# Patient Record
Sex: MALE | Race: OTHER | HISPANIC OR LATINO
[De-identification: names, ages, dates, MRNs, and addresses within clinical notes are randomized per-mention and may not be internally consistent; named-entity substitution may affect disease eponyms.]

---

## 2017-03-28 ENCOUNTER — APPOINTMENT (OUTPATIENT)
Dept: SURGERY | Facility: CLINIC | Age: 52
End: 2017-03-28

## 2017-03-28 VITALS
OXYGEN SATURATION: 97 % | HEART RATE: 93 BPM | HEIGHT: 71 IN | BODY MASS INDEX: 44.1 KG/M2 | SYSTOLIC BLOOD PRESSURE: 100 MMHG | DIASTOLIC BLOOD PRESSURE: 72 MMHG | WEIGHT: 315 LBS | TEMPERATURE: 97.9 F

## 2017-03-28 DIAGNOSIS — F17.200 NICOTINE DEPENDENCE, UNSPECIFIED, UNCOMPLICATED: ICD-10-CM

## 2017-03-28 DIAGNOSIS — I10 ESSENTIAL (PRIMARY) HYPERTENSION: ICD-10-CM

## 2017-04-11 ENCOUNTER — APPOINTMENT (OUTPATIENT)
Dept: SURGERY | Facility: CLINIC | Age: 52
End: 2017-04-11

## 2017-05-31 ENCOUNTER — OUTPATIENT (OUTPATIENT)
Dept: OUTPATIENT SERVICES | Facility: HOSPITAL | Age: 52
LOS: 1 days | End: 2017-05-31
Payer: COMMERCIAL

## 2017-05-31 DIAGNOSIS — R06.09 OTHER FORMS OF DYSPNEA: ICD-10-CM

## 2017-05-31 PROCEDURE — 93017 CV STRESS TEST TRACING ONLY: CPT

## 2017-05-31 PROCEDURE — A9500: CPT

## 2017-05-31 PROCEDURE — 78452 HT MUSCLE IMAGE SPECT MULT: CPT

## 2017-05-31 PROCEDURE — A9505: CPT

## 2017-06-05 ENCOUNTER — APPOINTMENT (OUTPATIENT)
Dept: SURGERY | Facility: CLINIC | Age: 52
End: 2017-06-05

## 2017-06-05 VITALS — BODY MASS INDEX: 51.68 KG/M2 | WEIGHT: 315 LBS

## 2017-06-12 ENCOUNTER — APPOINTMENT (OUTPATIENT)
Dept: GASTROENTEROLOGY | Facility: CLINIC | Age: 52
End: 2017-06-12

## 2017-06-12 VITALS
RESPIRATION RATE: 14 BRPM | SYSTOLIC BLOOD PRESSURE: 136 MMHG | OXYGEN SATURATION: 98 % | TEMPERATURE: 98.1 F | BODY MASS INDEX: 44.1 KG/M2 | DIASTOLIC BLOOD PRESSURE: 80 MMHG | WEIGHT: 315 LBS | HEART RATE: 89 BPM | HEIGHT: 71 IN

## 2017-06-12 RX ORDER — AMLODIPINE BESYLATE 10 MG/1
10 TABLET ORAL
Refills: 0 | Status: ACTIVE | COMMUNITY

## 2017-06-12 RX ORDER — LISINOPRIL 10 MG/1
10 TABLET ORAL
Refills: 0 | Status: ACTIVE | COMMUNITY

## 2017-06-15 VITALS
HEART RATE: 90 BPM | OXYGEN SATURATION: 96 % | SYSTOLIC BLOOD PRESSURE: 153 MMHG | TEMPERATURE: 97 F | RESPIRATION RATE: 16 BRPM | DIASTOLIC BLOOD PRESSURE: 78 MMHG

## 2017-06-15 RX ORDER — CHLORHEXIDINE GLUCONATE 213 G/1000ML
1 SOLUTION TOPICAL ONCE
Qty: 0 | Refills: 0 | Status: DISCONTINUED | OUTPATIENT
Start: 2017-06-16 | End: 2017-06-16

## 2017-06-15 NOTE — H&P ADULT - FAMILY HISTORY
Mother  Still living? Unknown  Family history of diabetes mellitus (DM), Age at diagnosis: Age Unknown     Child  Still living? Unknown  Family history of diabetes mellitus (DM), Age at diagnosis: Age Unknown

## 2017-06-15 NOTE — H&P ADULT - ASSESSMENT
53 y/o obese M with PMHx HTN, HLD, DM who presents for cardiac clearance in preparation for upcoming bariatric surgery. The patient denies cp, palpitations, dizziness, n/v, diaphoresis, orthopnea, PND, or syncope. He reports COLVIN when climbing stairs. He reports his exercise tolerance is limited secondary to foot pain which he attributes to him being overweight. Patient had NST on 5/31/17 revealing small area of mild ischemia in the inferolateral wall, overall left ventricular systolic function is normal with inferolateral wall hypokinesis, EF 57%. Echocardiogram from 5/9/17 revealed normal valvular function, mild MS, normal LV systolic function with EF > 55%, dilated aortic root at 4.0 cm, technically difficult study. In light of patient’s risk factors and abnormal NST, patient is recommended for cardiac catheterization with possible intervention if clinically indicated in order to obtain cardiac clearance for upcoming bariatric surgery.     Patient took ASA 81 mg this AM. Patient not to be loaded prior to procedure as per Dr. Hackett. IVF @ 75cc/hr.     ASA III and Mallampati III  Risks & benefits of procedure and alternative therapy have been explained to the patient including but not limited to: allergic reaction, bleeding w/possible need for blood transfusion, infection, renal and vascular compromise, limb damage, arrhythmia, stroke, vessel dissection/perforation, Myocardial infarction, emergent CABG. Informed consent obtained and in chart.

## 2017-06-15 NOTE — H&P ADULT - HISTORY OF PRESENT ILLNESS
53 y/o obese M with PMHx HTN, HLD, DM who presents for cardiac clearance in preparation for upcoming bariatric surgery. The patient denies cp, palpitations, dizziness, n/v, diaphoresis, orthopnea, PND, or syncope. He reports COLVIN when climbing stairs. He reports his exercise tolerance is limited secondary to foot pain which he attributes to him being overweight. Patient had NST on 5/31/17 revealing small area of mild ischemia in the inferolateral wall, overall left ventricular systolic function is normal with inferolateral wall hypokinesis, EF 57%. Echocardiogram from 5/9/17 revealed normal valvular function, mild CT, normal LV systolic function with EF > 55%, dilated aortic root at 4.0 cm, technically difficult study. In light of patient’s risk factors and abnormal NST, patient is recommended for cardiac catheterization with possible intervention if clinically indicated in order to obtain cardiac clearance for upcoming bariatric surgery.

## 2017-06-16 ENCOUNTER — OUTPATIENT (OUTPATIENT)
Dept: OUTPATIENT SERVICES | Facility: HOSPITAL | Age: 52
LOS: 1 days | Discharge: NURSING, REHAB OR LONG-TERM CARE | End: 2017-06-16
Payer: COMMERCIAL

## 2017-06-16 DIAGNOSIS — Z01.810 ENCOUNTER FOR PREPROCEDURAL CARDIOVASCULAR EXAMINATION: ICD-10-CM

## 2017-06-16 LAB
ALBUMIN SERPL ELPH-MCNC: 4 G/DL — SIGNIFICANT CHANGE UP (ref 3.3–5)
ALP SERPL-CCNC: 80 U/L — SIGNIFICANT CHANGE UP (ref 40–120)
ALT FLD-CCNC: 55 U/L — HIGH (ref 10–45)
ANION GAP SERPL CALC-SCNC: 16 MMOL/L — SIGNIFICANT CHANGE UP (ref 5–17)
APTT BLD: 39.1 SEC — HIGH (ref 27.5–37.4)
AST SERPL-CCNC: 43 U/L — HIGH (ref 10–40)
BASOPHILS NFR BLD AUTO: 0.8 % — SIGNIFICANT CHANGE UP (ref 0–2)
BILIRUB SERPL-MCNC: 0.7 MG/DL — SIGNIFICANT CHANGE UP (ref 0.2–1.2)
BUN SERPL-MCNC: 12 MG/DL — SIGNIFICANT CHANGE UP (ref 7–23)
CALCIUM SERPL-MCNC: 8.7 MG/DL — SIGNIFICANT CHANGE UP (ref 8.4–10.5)
CHLORIDE SERPL-SCNC: 100 MMOL/L — SIGNIFICANT CHANGE UP (ref 96–108)
CHOLEST SERPL-MCNC: 173 MG/DL — SIGNIFICANT CHANGE UP (ref 10–199)
CO2 SERPL-SCNC: 22 MMOL/L — SIGNIFICANT CHANGE UP (ref 22–31)
CREAT SERPL-MCNC: 0.9 MG/DL — SIGNIFICANT CHANGE UP (ref 0.5–1.3)
CRP SERPL-MCNC: 0.4 MG/DL — SIGNIFICANT CHANGE UP (ref 0–0.4)
EOSINOPHIL NFR BLD AUTO: 3.2 % — SIGNIFICANT CHANGE UP (ref 0–6)
GLUCOSE SERPL-MCNC: 125 MG/DL — HIGH (ref 70–99)
HBA1C BLD-MCNC: 7 % — HIGH (ref 4–5.6)
HCT VFR BLD CALC: 41.9 % — SIGNIFICANT CHANGE UP (ref 39–50)
HDLC SERPL-MCNC: 36 MG/DL — LOW (ref 40–125)
HGB BLD-MCNC: 14.7 G/DL — SIGNIFICANT CHANGE UP (ref 13–17)
INR BLD: 1.12 — SIGNIFICANT CHANGE UP (ref 0.88–1.16)
LIPID PNL WITH DIRECT LDL SERPL: 105 MG/DL — SIGNIFICANT CHANGE UP
LYMPHOCYTES # BLD AUTO: 34.1 % — SIGNIFICANT CHANGE UP (ref 13–44)
MCHC RBC-ENTMCNC: 31.2 PG — SIGNIFICANT CHANGE UP (ref 27–34)
MCHC RBC-ENTMCNC: 35.1 G/DL — SIGNIFICANT CHANGE UP (ref 32–36)
MCV RBC AUTO: 89 FL — SIGNIFICANT CHANGE UP (ref 80–100)
MONOCYTES NFR BLD AUTO: 7.8 % — SIGNIFICANT CHANGE UP (ref 2–14)
NEUTROPHILS NFR BLD AUTO: 54.1 % — SIGNIFICANT CHANGE UP (ref 43–77)
PLATELET # BLD AUTO: 218 K/UL — SIGNIFICANT CHANGE UP (ref 150–400)
POTASSIUM SERPL-MCNC: 4.1 MMOL/L — SIGNIFICANT CHANGE UP (ref 3.5–5.3)
POTASSIUM SERPL-SCNC: 4.1 MMOL/L — SIGNIFICANT CHANGE UP (ref 3.5–5.3)
PROT SERPL-MCNC: 7.2 G/DL — SIGNIFICANT CHANGE UP (ref 6–8.3)
PROTHROM AB SERPL-ACNC: 12.5 SEC — SIGNIFICANT CHANGE UP (ref 9.8–12.7)
RBC # BLD: 4.71 M/UL — SIGNIFICANT CHANGE UP (ref 4.2–5.8)
RBC # FLD: 12.8 % — SIGNIFICANT CHANGE UP (ref 10.3–16.9)
SODIUM SERPL-SCNC: 138 MMOL/L — SIGNIFICANT CHANGE UP (ref 135–145)
TOTAL CHOLESTEROL/HDL RATIO MEASUREMENT: 4.8 RATIO — SIGNIFICANT CHANGE UP (ref 3.4–9.6)
TRIGL SERPL-MCNC: 161 MG/DL — HIGH (ref 10–149)
WBC # BLD: 7.2 K/UL — SIGNIFICANT CHANGE UP (ref 3.8–10.5)
WBC # FLD AUTO: 7.2 K/UL — SIGNIFICANT CHANGE UP (ref 3.8–10.5)

## 2017-06-16 PROCEDURE — 85730 THROMBOPLASTIN TIME PARTIAL: CPT

## 2017-06-16 PROCEDURE — 82553 CREATINE MB FRACTION: CPT

## 2017-06-16 PROCEDURE — 93010 ELECTROCARDIOGRAM REPORT: CPT | Mod: 59

## 2017-06-16 PROCEDURE — 86140 C-REACTIVE PROTEIN: CPT

## 2017-06-16 PROCEDURE — 85610 PROTHROMBIN TIME: CPT

## 2017-06-16 PROCEDURE — 82550 ASSAY OF CK (CPK): CPT

## 2017-06-16 PROCEDURE — 85025 COMPLETE CBC W/AUTO DIFF WBC: CPT

## 2017-06-16 PROCEDURE — C1887: CPT

## 2017-06-16 PROCEDURE — 80053 COMPREHEN METABOLIC PANEL: CPT

## 2017-06-16 PROCEDURE — 93458 L HRT ARTERY/VENTRICLE ANGIO: CPT | Mod: 26

## 2017-06-16 PROCEDURE — 83036 HEMOGLOBIN GLYCOSYLATED A1C: CPT

## 2017-06-16 PROCEDURE — C1769: CPT

## 2017-06-16 PROCEDURE — 93005 ELECTROCARDIOGRAM TRACING: CPT

## 2017-06-16 PROCEDURE — 93458 L HRT ARTERY/VENTRICLE ANGIO: CPT

## 2017-06-16 PROCEDURE — 80061 LIPID PANEL: CPT

## 2017-06-16 RX ORDER — SODIUM CHLORIDE 9 MG/ML
500 INJECTION INTRAMUSCULAR; INTRAVENOUS; SUBCUTANEOUS
Qty: 0 | Refills: 0 | Status: DISCONTINUED | OUTPATIENT
Start: 2017-06-16 | End: 2017-06-16

## 2017-06-16 RX ORDER — METFORMIN HYDROCHLORIDE 850 MG/1
0 TABLET ORAL
Qty: 0 | Refills: 0 | COMMUNITY

## 2017-06-16 RX ADMIN — SODIUM CHLORIDE 75 MILLILITER(S): 9 INJECTION INTRAMUSCULAR; INTRAVENOUS; SUBCUTANEOUS at 12:43

## 2017-06-16 NOTE — PROGRESS NOTE ADULT - SUBJECTIVE AND OBJECTIVE BOX
Interventional Cardiology PA SDA Discharge Note    Patient without complaints.     Afebrile, VSS    Ext:    		Right Radial :  no  hematoma, no    bleeding, dressing; C/D/I      Pulses:    intact RAD to baseline     A/P:  53 y/o obese M with PMHx HTN, HLD, DM who presents for cardiac clearance in preparation for upcoming bariatric surgery. The patient denies cp, palpitations, dizziness, n/v, diaphoresis, orthopnea, PND, or syncope. He reports COLVIN when climbing stairs. He reports his exercise tolerance is limited secondary to foot pain which he attributes to him being overweight. Patient had NST on 5/31/17 revealing small area of mild ischemia in the inferolateral wall, overall left ventricular systolic function is normal with inferolateral wall hypokinesis, EF 57%. Echocardiogram from 5/9/17 revealed normal valvular function, mild LA, normal LV systolic function with EF > 55%, dilated aortic root at 4.0 cm, technically difficult study. In light of patient’s risk factors and abnormal NST, patient is recommended for cardiac catheterization with possible intervention if clinically indicated in order to obtain cardiac clearance for upcoming bariatric surgery.       Cath revealed: nonobstructive CAD, EF 60%            1.	Stable for discharge as per attending Dr. _____Kim____ after bed rest, pt voids, groin/wrist stable and 30 minutes of ambulation.  2.	Follow-up with PMD/Cardiologist _____Pilar______ in 1-2 weeks  3.	Discharged forms signed and copies in chart

## 2017-06-30 PROBLEM — E11.9 TYPE 2 DIABETES MELLITUS WITHOUT COMPLICATIONS: Chronic | Status: ACTIVE | Noted: 2017-06-15

## 2017-06-30 PROBLEM — I10 ESSENTIAL (PRIMARY) HYPERTENSION: Chronic | Status: ACTIVE | Noted: 2017-06-15

## 2017-06-30 PROBLEM — E66.9 OBESITY, UNSPECIFIED: Chronic | Status: ACTIVE | Noted: 2017-06-15

## 2017-06-30 PROBLEM — E78.5 HYPERLIPIDEMIA, UNSPECIFIED: Chronic | Status: ACTIVE | Noted: 2017-06-15

## 2017-07-05 ENCOUNTER — RESULT REVIEW (OUTPATIENT)
Age: 52
End: 2017-07-05

## 2017-07-05 ENCOUNTER — APPOINTMENT (OUTPATIENT)
Dept: GASTROENTEROLOGY | Facility: HOSPITAL | Age: 52
End: 2017-07-05

## 2017-07-05 ENCOUNTER — OUTPATIENT (OUTPATIENT)
Dept: OUTPATIENT SERVICES | Facility: HOSPITAL | Age: 52
LOS: 1 days | Discharge: HOME | End: 2017-07-05
Payer: COMMERCIAL

## 2017-07-05 PROCEDURE — 88305 TISSUE EXAM BY PATHOLOGIST: CPT

## 2017-07-05 PROCEDURE — 43239 EGD BIOPSY SINGLE/MULTIPLE: CPT

## 2017-07-06 LAB — SURGICAL PATHOLOGY STUDY: SIGNIFICANT CHANGE UP

## 2017-07-10 DIAGNOSIS — Z01.818 ENCOUNTER FOR OTHER PREPROCEDURAL EXAMINATION: ICD-10-CM

## 2017-07-10 DIAGNOSIS — I10 ESSENTIAL (PRIMARY) HYPERTENSION: ICD-10-CM

## 2017-07-10 DIAGNOSIS — I25.10 ATHEROSCLEROTIC HEART DISEASE OF NATIVE CORONARY ARTERY WITHOUT ANGINA PECTORIS: ICD-10-CM

## 2017-07-10 DIAGNOSIS — E66.01 MORBID (SEVERE) OBESITY DUE TO EXCESS CALORIES: ICD-10-CM

## 2017-07-10 DIAGNOSIS — B96.81 HELICOBACTER PYLORI [H. PYLORI] AS THE CAUSE OF DISEASES CLASSIFIED ELSEWHERE: ICD-10-CM

## 2017-07-10 DIAGNOSIS — K29.50 UNSPECIFIED CHRONIC GASTRITIS WITHOUT BLEEDING: ICD-10-CM

## 2017-07-10 DIAGNOSIS — Z79.82 LONG TERM (CURRENT) USE OF ASPIRIN: ICD-10-CM

## 2017-07-10 DIAGNOSIS — E11.9 TYPE 2 DIABETES MELLITUS WITHOUT COMPLICATIONS: ICD-10-CM

## 2017-07-10 DIAGNOSIS — Z79.84 LONG TERM (CURRENT) USE OF ORAL HYPOGLYCEMIC DRUGS: ICD-10-CM

## 2017-07-19 ENCOUNTER — OUTPATIENT (OUTPATIENT)
Dept: OUTPATIENT SERVICES | Facility: HOSPITAL | Age: 52
LOS: 1 days | End: 2017-07-19
Payer: COMMERCIAL

## 2017-07-19 PROCEDURE — 74174 CTA ABD&PLVS W/CONTRAST: CPT | Mod: 26

## 2017-07-19 PROCEDURE — 71275 CT ANGIOGRAPHY CHEST: CPT | Mod: 26

## 2017-07-19 PROCEDURE — 74174 CTA ABD&PLVS W/CONTRAST: CPT

## 2017-07-19 PROCEDURE — 71275 CT ANGIOGRAPHY CHEST: CPT

## 2017-07-31 ENCOUNTER — LABORATORY RESULT (OUTPATIENT)
Age: 52
End: 2017-07-31

## 2017-08-01 ENCOUNTER — APPOINTMENT (OUTPATIENT)
Dept: SURGERY | Facility: CLINIC | Age: 52
End: 2017-08-01
Payer: MEDICAID

## 2017-08-01 VITALS
HEIGHT: 71 IN | WEIGHT: 315 LBS | BODY MASS INDEX: 44.1 KG/M2 | HEART RATE: 81 BPM | SYSTOLIC BLOOD PRESSURE: 120 MMHG | OXYGEN SATURATION: 98 % | TEMPERATURE: 98 F | DIASTOLIC BLOOD PRESSURE: 87 MMHG

## 2017-08-01 DIAGNOSIS — A04.8 OTHER SPECIFIED BACTERIAL INTESTINAL INFECTIONS: ICD-10-CM

## 2017-08-01 PROCEDURE — 99214 OFFICE O/P EST MOD 30 MIN: CPT

## 2017-08-02 PROBLEM — A04.8 H. PYLORI INFECTION: Status: ACTIVE | Noted: 2017-07-06

## 2017-08-02 LAB — UREA BREATH TEST QL: NEGATIVE

## 2017-08-02 RX ORDER — LANSOPRAZOLE, AMOXICILLIN, CLARITHROMYCIN 30-500-500
KIT ORAL
Qty: 36 | Refills: 0 | Status: DISCONTINUED | COMMUNITY
Start: 2017-07-06 | End: 2017-08-02

## 2017-08-03 ENCOUNTER — APPOINTMENT (OUTPATIENT)
Dept: SURGERY | Facility: CLINIC | Age: 52
End: 2017-08-03

## 2017-08-03 VITALS — BODY MASS INDEX: 51.47 KG/M2 | WEIGHT: 315 LBS

## 2017-08-24 ENCOUNTER — APPOINTMENT (OUTPATIENT)
Dept: SURGERY | Facility: CLINIC | Age: 52
End: 2017-08-24

## 2017-09-22 VITALS
WEIGHT: 315 LBS | HEIGHT: 72 IN | HEART RATE: 83 BPM | OXYGEN SATURATION: 98 % | DIASTOLIC BLOOD PRESSURE: 69 MMHG | SYSTOLIC BLOOD PRESSURE: 136 MMHG | RESPIRATION RATE: 18 BRPM | TEMPERATURE: 99 F

## 2017-09-22 RX ORDER — METFORMIN HYDROCHLORIDE 850 MG/1
0 TABLET ORAL
Qty: 0 | Refills: 0 | COMMUNITY

## 2017-09-22 NOTE — PATIENT PROFILE ADULT. - PMH
DM (diabetes mellitus)    HLD (hyperlipidemia)    HTN (hypertension)    Obesity DM (diabetes mellitus)    HLD (hyperlipidemia)    HTN (hypertension)    Obesity    SILVIA (obstructive sleep apnea)

## 2017-09-25 ENCOUNTER — INPATIENT (INPATIENT)
Facility: HOSPITAL | Age: 52
LOS: 1 days | Discharge: HOME | DRG: 621 | End: 2017-09-27
Attending: SURGERY | Admitting: SURGERY
Payer: COMMERCIAL

## 2017-09-25 ENCOUNTER — APPOINTMENT (OUTPATIENT)
Dept: SURGERY | Facility: HOSPITAL | Age: 52
End: 2017-09-25
Payer: MEDICAID

## 2017-09-25 PROCEDURE — 43645 LAP GASTR BYPASS INCL SMLL I: CPT

## 2017-09-25 RX ORDER — SODIUM CHLORIDE 9 MG/ML
1000 INJECTION, SOLUTION INTRAVENOUS
Qty: 0 | Refills: 0 | Status: DISCONTINUED | OUTPATIENT
Start: 2017-09-25 | End: 2017-09-27

## 2017-09-25 RX ORDER — METOCLOPRAMIDE HCL 10 MG
10 TABLET ORAL EVERY 6 HOURS
Qty: 0 | Refills: 0 | Status: DISCONTINUED | OUTPATIENT
Start: 2017-09-25 | End: 2017-09-27

## 2017-09-25 RX ORDER — LISINOPRIL 2.5 MG/1
1 TABLET ORAL
Qty: 0 | Refills: 0 | COMMUNITY

## 2017-09-25 RX ORDER — SCOPALAMINE 1 MG/3D
1.5 PATCH, EXTENDED RELEASE TRANSDERMAL ONCE
Qty: 0 | Refills: 0 | Status: COMPLETED | OUTPATIENT
Start: 2017-09-25 | End: 2017-09-25

## 2017-09-25 RX ORDER — HYDROMORPHONE HYDROCHLORIDE 2 MG/ML
1 INJECTION INTRAMUSCULAR; INTRAVENOUS; SUBCUTANEOUS EVERY 4 HOURS
Qty: 0 | Refills: 0 | Status: DISCONTINUED | OUTPATIENT
Start: 2017-09-25 | End: 2017-09-27

## 2017-09-25 RX ORDER — DEXTROSE 50 % IN WATER 50 %
12.5 SYRINGE (ML) INTRAVENOUS ONCE
Qty: 0 | Refills: 0 | Status: DISCONTINUED | OUTPATIENT
Start: 2017-09-25 | End: 2017-09-27

## 2017-09-25 RX ORDER — AMLODIPINE BESYLATE 2.5 MG/1
1 TABLET ORAL
Qty: 0 | Refills: 0 | COMMUNITY

## 2017-09-25 RX ORDER — ASPIRIN/CALCIUM CARB/MAGNESIUM 324 MG
1 TABLET ORAL
Qty: 0 | Refills: 0 | COMMUNITY

## 2017-09-25 RX ORDER — DEXTROSE 50 % IN WATER 50 %
25 SYRINGE (ML) INTRAVENOUS ONCE
Qty: 0 | Refills: 0 | Status: DISCONTINUED | OUTPATIENT
Start: 2017-09-25 | End: 2017-09-27

## 2017-09-25 RX ORDER — ENOXAPARIN SODIUM 100 MG/ML
40 INJECTION SUBCUTANEOUS EVERY 12 HOURS
Qty: 0 | Refills: 0 | Status: DISCONTINUED | OUTPATIENT
Start: 2017-09-25 | End: 2017-09-27

## 2017-09-25 RX ORDER — ONDANSETRON 8 MG/1
4 TABLET, FILM COATED ORAL EVERY 6 HOURS
Qty: 0 | Refills: 0 | Status: DISCONTINUED | OUTPATIENT
Start: 2017-09-25 | End: 2017-09-27

## 2017-09-25 RX ORDER — ENOXAPARIN SODIUM 100 MG/ML
40 INJECTION SUBCUTANEOUS ONCE
Qty: 0 | Refills: 0 | Status: COMPLETED | OUTPATIENT
Start: 2017-09-25 | End: 2017-09-25

## 2017-09-25 RX ORDER — HYDROMORPHONE HYDROCHLORIDE 2 MG/ML
0.5 INJECTION INTRAMUSCULAR; INTRAVENOUS; SUBCUTANEOUS EVERY 4 HOURS
Qty: 0 | Refills: 0 | Status: DISCONTINUED | OUTPATIENT
Start: 2017-09-25 | End: 2017-09-27

## 2017-09-25 RX ORDER — INSULIN LISPRO 100/ML
VIAL (ML) SUBCUTANEOUS EVERY 6 HOURS
Qty: 0 | Refills: 0 | Status: DISCONTINUED | OUTPATIENT
Start: 2017-09-25 | End: 2017-09-27

## 2017-09-25 RX ADMIN — SCOPALAMINE 1.5 MILLIGRAM(S): 1 PATCH, EXTENDED RELEASE TRANSDERMAL at 11:58

## 2017-09-25 RX ADMIN — ENOXAPARIN SODIUM 40 MILLIGRAM(S): 100 INJECTION SUBCUTANEOUS at 11:58

## 2017-09-25 RX ADMIN — Medication 4: at 22:55

## 2017-09-25 NOTE — BRIEF OPERATIVE NOTE - PROCEDURE
<<-----Click on this checkbox to enter Procedure Laparoscopic gastric bypass  09/25/2017    Active  JHONATHAN

## 2017-09-25 NOTE — BRIEF OPERATIVE NOTE - OPERATION/FINDINGS
Laparoscopic robotic assisted lino-en-y gastric bypass. 150cm alimentary limb, 100cm lino limb.  Stomach divided with blue load laparoscopic stapler.  Anticolic-antigastric gastrojejunal two-layer handsewn anastomosis. Air leak test negative.  All mesenteric defects closed primarily. Hemostasis achieved at end of case.

## 2017-09-25 NOTE — H&P ADULT - ASSESSMENT
52M with Hx of HTN, SILVIA, DM, MO (BMI 50) here for an elective laparoscopic robotic assisted lino-en-y gastric bypass.    Pain/nausea control  A/C: Lovenox 40mg BID  NPO  Protonix 40mg IV QD  ISS  IVF LR@200cc/hr  AM labs  UGI in AM/POD1  Dietician consult  OOBA/IS/SCDs

## 2017-09-25 NOTE — H&P ADULT - NSHPPHYSICALEXAM_GEN_ALL_CORE
Gen: Morbidly obese male in NAD  CV: Nontachycardic  Resp: No increased work of breathing  Abd: Obese, s/nt/nd  Ext: WWP

## 2017-09-25 NOTE — H&P ADULT - HISTORY OF PRESENT ILLNESS
52M with a Hx of HTN, SILVIA (not on CPAP at home), borderline DM, MO (BMI 50), presented to [facility] after multiple failed attempts at weight loss with diet control and exercise. Preoperative H/H 15/46.    PMH: HTN, MO, SILVIA, DM  Meds: amlodipine 10mg qd, ASA 81mg qd, flonase 50mcg/act nasal spray, lisinopril 40mg qd, metformin 500 qd.  NKDA  PSH: None  Social: Recently quite smoking 7weeks prior to surgery, denies ETOH/IVDA

## 2017-09-25 NOTE — H&P ADULT - PMH
DM (diabetes mellitus)    HLD (hyperlipidemia)    HTN (hypertension)    Obesity    SILVIA (obstructive sleep apnea)

## 2017-09-26 ENCOUNTER — TRANSCRIPTION ENCOUNTER (OUTPATIENT)
Age: 52
End: 2017-09-26

## 2017-09-26 LAB
ANION GAP SERPL CALC-SCNC: 12 MMOL/L — SIGNIFICANT CHANGE UP (ref 5–17)
BUN SERPL-MCNC: 14 MG/DL — SIGNIFICANT CHANGE UP (ref 7–23)
CALCIUM SERPL-MCNC: 8.6 MG/DL — SIGNIFICANT CHANGE UP (ref 8.4–10.5)
CHLORIDE SERPL-SCNC: 97 MMOL/L — SIGNIFICANT CHANGE UP (ref 96–108)
CO2 SERPL-SCNC: 26 MMOL/L — SIGNIFICANT CHANGE UP (ref 22–31)
CREAT SERPL-MCNC: 1.1 MG/DL — SIGNIFICANT CHANGE UP (ref 0.5–1.3)
GLUCOSE SERPL-MCNC: 221 MG/DL — HIGH (ref 70–99)
HBA1C BLD-MCNC: 6.8 % — HIGH (ref 4–5.6)
HCT VFR BLD CALC: 41.4 % — SIGNIFICANT CHANGE UP (ref 39–50)
HGB BLD-MCNC: 14.3 G/DL — SIGNIFICANT CHANGE UP (ref 13–17)
MAGNESIUM SERPL-MCNC: 1.8 MG/DL — SIGNIFICANT CHANGE UP (ref 1.6–2.6)
MCHC RBC-ENTMCNC: 31.6 PG — SIGNIFICANT CHANGE UP (ref 27–34)
MCHC RBC-ENTMCNC: 34.5 G/DL — SIGNIFICANT CHANGE UP (ref 32–36)
MCV RBC AUTO: 91.6 FL — SIGNIFICANT CHANGE UP (ref 80–100)
PHOSPHATE SERPL-MCNC: 2.1 MG/DL — LOW (ref 2.5–4.5)
PLATELET # BLD AUTO: 189 K/UL — SIGNIFICANT CHANGE UP (ref 150–400)
POTASSIUM SERPL-MCNC: 4.6 MMOL/L — SIGNIFICANT CHANGE UP (ref 3.5–5.3)
POTASSIUM SERPL-SCNC: 4.6 MMOL/L — SIGNIFICANT CHANGE UP (ref 3.5–5.3)
RBC # BLD: 4.52 M/UL — SIGNIFICANT CHANGE UP (ref 4.2–5.8)
RBC # FLD: 12.9 % — SIGNIFICANT CHANGE UP (ref 10.3–16.9)
SODIUM SERPL-SCNC: 135 MMOL/L — SIGNIFICANT CHANGE UP (ref 135–145)
WBC # BLD: 15.8 K/UL — HIGH (ref 3.8–10.5)
WBC # FLD AUTO: 15.8 K/UL — HIGH (ref 3.8–10.5)

## 2017-09-26 PROCEDURE — 74241: CPT | Mod: 26

## 2017-09-26 RX ORDER — POTASSIUM PHOSPHATE, MONOBASIC POTASSIUM PHOSPHATE, DIBASIC 236; 224 MG/ML; MG/ML
15 INJECTION, SOLUTION INTRAVENOUS ONCE
Qty: 0 | Refills: 0 | Status: COMPLETED | OUTPATIENT
Start: 2017-09-26 | End: 2017-09-26

## 2017-09-26 RX ORDER — LISINOPRIL 2.5 MG/1
40 TABLET ORAL DAILY
Qty: 0 | Refills: 0 | Status: DISCONTINUED | OUTPATIENT
Start: 2017-09-26 | End: 2017-09-27

## 2017-09-26 RX ORDER — AMLODIPINE BESYLATE 2.5 MG/1
10 TABLET ORAL DAILY
Qty: 0 | Refills: 0 | Status: DISCONTINUED | OUTPATIENT
Start: 2017-09-26 | End: 2017-09-27

## 2017-09-26 RX ORDER — MAGNESIUM SULFATE 500 MG/ML
1 VIAL (ML) INJECTION ONCE
Qty: 0 | Refills: 0 | Status: COMPLETED | OUTPATIENT
Start: 2017-09-26 | End: 2017-09-26

## 2017-09-26 RX ADMIN — POTASSIUM PHOSPHATE, MONOBASIC POTASSIUM PHOSPHATE, DIBASIC 62.5 MILLIMOLE(S): 236; 224 INJECTION, SOLUTION INTRAVENOUS at 10:55

## 2017-09-26 RX ADMIN — HYDROMORPHONE HYDROCHLORIDE 1 MILLIGRAM(S): 2 INJECTION INTRAMUSCULAR; INTRAVENOUS; SUBCUTANEOUS at 21:57

## 2017-09-26 RX ADMIN — Medication 2: at 12:40

## 2017-09-26 RX ADMIN — ENOXAPARIN SODIUM 40 MILLIGRAM(S): 100 INJECTION SUBCUTANEOUS at 18:51

## 2017-09-26 RX ADMIN — ENOXAPARIN SODIUM 40 MILLIGRAM(S): 100 INJECTION SUBCUTANEOUS at 06:46

## 2017-09-26 RX ADMIN — Medication 2: at 06:47

## 2017-09-26 RX ADMIN — Medication 100 GRAM(S): at 10:54

## 2017-09-26 RX ADMIN — HYDROMORPHONE HYDROCHLORIDE 1 MILLIGRAM(S): 2 INJECTION INTRAMUSCULAR; INTRAVENOUS; SUBCUTANEOUS at 21:27

## 2017-09-26 NOTE — DISCHARGE NOTE ADULT - CARE PLAN
Principal Discharge DX:	Obesity Principal Discharge DX:	Obesity  Goal:	Recovery  Instructions for follow-up, activity and diet:	Follow up with Dr. Wilcox  in 1 week. Call the office at  to schedule your appointment. You may shower; soap and water over incision sites. Do not scrub. Pat dry when done. No tub bathing or swimming until cleared. Keep incision sites out of the sun as scars will darken. No heavy lifting (>10lbs) or strenuous exercise. Diet: Bariatric Full Fluids. 60 grams protein daily.  64 fluid ounces water daily. Drink small sips throughout the day. Continue diet as outlined by paperwork received as a pre-operative patient. You should be urinating at least 3-4x per day. Call the office if you experience increasing abdominal pain, nausea, vomiting, or temperature >100.4F.  NO ASPIRIN OR NSAIDs until approved by Dr. Wilcox . Avoid alcoholic beverages until cleared by Dr. Wilcox.

## 2017-09-26 NOTE — DIETITIAN INITIAL EVALUATION ADULT. - ENERGY NEEDS
Height: 6'0" Weight: 367lbs, IBW 178lbs+/-10%, %%, BMI 49.9  IBW used for calculations as pt >120% of IBW  Above energy needs calculated for wt maintenance (20-25kcal/kg).   Weeks 1-2 estimated needs: 807-968kcal/day (10-12kcal/kg), 121-169g pro/day (1.5-2.1g/kg), >/=64oz clear fluids.

## 2017-09-26 NOTE — DISCHARGE NOTE ADULT - PLAN OF CARE
Recovery Follow up with Dr. Wilcox  in 1 week. Call the office at  to schedule your appointment. You may shower; soap and water over incision sites. Do not scrub. Pat dry when done. No tub bathing or swimming until cleared. Keep incision sites out of the sun as scars will darken. No heavy lifting (>10lbs) or strenuous exercise. Diet: Bariatric Full Fluids. 60 grams protein daily.  64 fluid ounces water daily. Drink small sips throughout the day. Continue diet as outlined by paperwork received as a pre-operative patient. You should be urinating at least 3-4x per day. Call the office if you experience increasing abdominal pain, nausea, vomiting, or temperature >100.4F.  NO ASPIRIN OR NSAIDs until approved by Dr. Wilcox . Avoid alcoholic beverages until cleared by Dr. Wilcox.

## 2017-09-26 NOTE — DISCHARGE NOTE ADULT - HOSPITAL COURSE
52 year old male here as SDA for elective procedure. Patient s/p RYGB.. Pt tolerated the procedure well. Post-operatively, the pt's UGI was wnl. At time of discharge, pt was tolerating a bariatric clear liquid diet, and pt's pain was controlled. Plan is to follow up with Dr. Wilcox in the office.

## 2017-09-26 NOTE — DISCHARGE NOTE ADULT - MEDICATION SUMMARY - MEDICATIONS TO TAKE
I will START or STAY ON the medications listed below when I get home from the hospital:    Ecotrin Adult Low Strength 81 mg oral delayed release tablet  -- 1 tab(s) by mouth once a day  -- Indication: For Home Meds    Percocet 5/325 oral tablet  -- 1 tab(s) by mouth every 6 hours, As Needed -for severe pain MDD:4   -- Caution federal law prohibits the transfer of this drug to any person other  than the person for whom it was prescribed.  May cause drowsiness.  Alcohol may intensify this effect.  Use care when operating dangerous machinery.  This prescription cannot be refilled.  This product contains acetaminophen.  Do not use  with any other product containing acetaminophen to prevent possible liver damage.  Using more of this medication than prescribed may cause serious breathing problems.    -- Indication: For Post-operative    lisinopril 40 mg oral tablet  -- 1 tab(s) by mouth once a day  -- Indication: For Home Meds    amLODIPine 10 mg oral tablet  -- 1 tab(s) by mouth once a day  -- Indication: For Home Meds    Colace 100 mg oral capsule  -- 1 cap(s) by mouth 2 times a day   -- Medication should be taken with plenty of water.    -- Indication: For Post-operative    Protonix 40 mg oral delayed release tablet  -- 1 tab(s) by mouth once a day   -- It is very important that you take or use this exactly as directed.  Do not skip doses or discontinue unless directed by your doctor.  Obtain medical advice before taking any non-prescription drugs as some may affect the action of this medication.  Swallow whole.  Do not crush.    -- Indication: For Post-operative

## 2017-09-26 NOTE — PROGRESS NOTE ADULT - SUBJECTIVE AND OBJECTIVE BOX
Procedure: RYGB  Surgeon: Dr. Wilcox    S: Pt has no complaints. Denies CP, SOB, COLVIN, calf tenderness. Pain controlled with medication.    O:  T(C): --  T(F): -- 97.6  HR: -- 100  BP: -- 114/54  RR: -- 16  SpO2: -- 97  Wt(kg): --            Gen: NAD, resting comfortably in bed  C/V: NSR  Pulm: Nonlabored breathing, no respiratory distress  Abd: soft, NT/ND Incision: CDI  Extrem: WWP, no calf edema, SCDs in place      A/P: 52yMale s/p above procedure  Diet: NPO  IVF: LR  Pain/nausea control  DVT ppx: SCDS/SQH

## 2017-09-26 NOTE — PROGRESS NOTE ADULT - SUBJECTIVE AND OBJECTIVE BOX
O/N: POC wnl, postop labs in am labs. alta in  9/25: OR for robotic gastric bypass. OVERNIGHT EVENTS: POC wnl, postop labs in am labs. alta in  9/25: OR for robotic gastric bypass.    STATUS POST:  Robotic gastric bypass.    POST OPERATIVE DAY #: 1    SUBJECTIVE:  Patient examined bedside. Patient complains of incisional pain.  Flatus: [] YES [X] NO             Bowel Movement: [ ] YES [X ] NO  Pain (0-10):            Pain Control Adequate: [X ] YES [ ] NO  Nausea: [ ] YES [ X] NO            Vomiting: [ ] YES [ X] NO  Diarrhea: [ ] YES [X ] NO         Constipation: [ ] YES [X ] NO     Chest Pain: [ ] YES [ X] NO    SOB:  [ ] YES [X ] NO    enoxaparin Injectable 40 milliGRAM(s) SubCutaneous every 12 hours      Vital Signs Last 24 Hrs  T(C): 37.3 (26 Sep 2017 05:43), Max: 37.3 (26 Sep 2017 05:43)  T(F): 99.1 (26 Sep 2017 05:43), Max: 99.1 (26 Sep 2017 05:43)  HR: 108 (26 Sep 2017 06:40) (90 - 108)  BP: 130/69 (26 Sep 2017 03:50) (94/54 - 130/69)  BP(mean): 91 (26 Sep 2017 03:50) (74 - 91)  RR: 20 (26 Sep 2017 06:40) (16 - 20)  SpO2: 97% (26 Sep 2017 06:40) (94% - 97%)  I&O's Detail    25 Sep 2017 07:01  -  26 Sep 2017 07:00  --------------------------------------------------------  IN:    lactated ringers.: 400 mL  Total IN: 400 mL    OUT:    Indwelling Catheter - Urethral: 600 mL  Total OUT: 600 mL    Total NET: -200 mL          General: NAD, resting comfortably in bed  C/V: NSR  Pulm: Nonlabored breathing, no respiratory distress  Abd: soft, TTP around incision site, incision clean dry and intact   Extrem: WWP, no edema, SCDs in place        LABS:                        14.3   15.8  )-----------( 189      ( 26 Sep 2017 05:53 )             41.4     09-26    135  |  97  |  14  ----------------------------<  221<H>  4.6   |  26  |  1.10    Ca    8.6      26 Sep 2017 05:54  Phos  2.1     09-26  Mg     1.8     09-26

## 2017-09-26 NOTE — DIETITIAN INITIAL EVALUATION ADULT. - NS AS NUTRI INTERV ED CONTENT
Educated on bariatric diet progression, importance of adequate protein, hydration, and compliance with vitamin supplements.  Pt expressed positive, verbal understanding; expected compliance is good./Purpose of the nutrition education

## 2017-09-26 NOTE — DIETITIAN INITIAL EVALUATION ADULT. - ETIOLOGY
RT predicted hypercaloric intake prior to admission and unsatisfactory results with previous wt loss attempts

## 2017-09-26 NOTE — DIETITIAN INITIAL EVALUATION ADULT. - OTHER INFO
51yo M s/p RYGB POD1. Pt NPO at time of assessment, now advanecd to BARICLLIQ. Denies GI distress. States having mild discomfort. Pain controlled. Has appropriate protein supplements and vitamins & minerals ordered. NKFA or dietary restrictions. Skin: surgical incision, GI WDL per flowsheet.

## 2017-09-26 NOTE — DISCHARGE NOTE ADULT - ADDITIONAL INSTRUCTIONS
1) Please take Percocet 1 to 2 tabs by mouth every 4 to 6 hours as needed for pain control.  2) Please take Protonix 1 tab my mouth once a day.  3) Please take Colace 100 mg twice a day as needed for constipation.

## 2017-09-26 NOTE — DISCHARGE NOTE ADULT - PATIENT PORTAL LINK FT
“You can access the Follow[employer] Patient Portal, offered by [facility], by registering with the following website: [email or web address]”

## 2017-09-27 VITALS — TEMPERATURE: 98 F

## 2017-09-27 LAB
ANION GAP SERPL CALC-SCNC: 10 MMOL/L — SIGNIFICANT CHANGE UP (ref 5–17)
BUN SERPL-MCNC: 15 MG/DL — SIGNIFICANT CHANGE UP (ref 7–23)
CALCIUM SERPL-MCNC: 8.6 MG/DL — SIGNIFICANT CHANGE UP (ref 8.4–10.5)
CHLORIDE SERPL-SCNC: 98 MMOL/L — SIGNIFICANT CHANGE UP (ref 96–108)
CO2 SERPL-SCNC: 29 MMOL/L — SIGNIFICANT CHANGE UP (ref 22–31)
CREAT SERPL-MCNC: 1.02 MG/DL — SIGNIFICANT CHANGE UP (ref 0.5–1.3)
GLUCOSE SERPL-MCNC: 149 MG/DL — HIGH (ref 70–99)
HCT VFR BLD CALC: 41.5 % — SIGNIFICANT CHANGE UP (ref 39–50)
HGB BLD-MCNC: 13.5 G/DL — SIGNIFICANT CHANGE UP (ref 13–17)
MAGNESIUM SERPL-MCNC: 2.1 MG/DL — SIGNIFICANT CHANGE UP (ref 1.6–2.6)
MCHC RBC-ENTMCNC: 30 PG — SIGNIFICANT CHANGE UP (ref 27–34)
MCHC RBC-ENTMCNC: 32.5 G/DL — SIGNIFICANT CHANGE UP (ref 32–36)
MCV RBC AUTO: 92.2 FL — SIGNIFICANT CHANGE UP (ref 80–100)
PHOSPHATE SERPL-MCNC: 2.8 MG/DL — SIGNIFICANT CHANGE UP (ref 2.5–4.5)
PLATELET # BLD AUTO: 165 K/UL — SIGNIFICANT CHANGE UP (ref 150–400)
POTASSIUM SERPL-MCNC: 4.3 MMOL/L — SIGNIFICANT CHANGE UP (ref 3.5–5.3)
POTASSIUM SERPL-SCNC: 4.3 MMOL/L — SIGNIFICANT CHANGE UP (ref 3.5–5.3)
RBC # BLD: 4.5 M/UL — SIGNIFICANT CHANGE UP (ref 4.2–5.8)
RBC # FLD: 13.3 % — SIGNIFICANT CHANGE UP (ref 10.3–16.9)
SODIUM SERPL-SCNC: 137 MMOL/L — SIGNIFICANT CHANGE UP (ref 135–145)
WBC # BLD: 12 K/UL — HIGH (ref 3.8–10.5)
WBC # FLD AUTO: 12 K/UL — HIGH (ref 3.8–10.5)

## 2017-09-27 PROCEDURE — 74241: CPT

## 2017-09-27 PROCEDURE — 86900 BLOOD TYPING SEROLOGIC ABO: CPT

## 2017-09-27 PROCEDURE — 86850 RBC ANTIBODY SCREEN: CPT

## 2017-09-27 PROCEDURE — 36415 COLL VENOUS BLD VENIPUNCTURE: CPT

## 2017-09-27 PROCEDURE — 83036 HEMOGLOBIN GLYCOSYLATED A1C: CPT

## 2017-09-27 PROCEDURE — 85027 COMPLETE CBC AUTOMATED: CPT

## 2017-09-27 PROCEDURE — 80048 BASIC METABOLIC PNL TOTAL CA: CPT

## 2017-09-27 PROCEDURE — 86901 BLOOD TYPING SEROLOGIC RH(D): CPT

## 2017-09-27 PROCEDURE — 94660 CPAP INITIATION&MGMT: CPT

## 2017-09-27 PROCEDURE — 83735 ASSAY OF MAGNESIUM: CPT

## 2017-09-27 PROCEDURE — 84100 ASSAY OF PHOSPHORUS: CPT

## 2017-09-27 PROCEDURE — 90686 IIV4 VACC NO PRSV 0.5 ML IM: CPT

## 2017-09-27 PROCEDURE — S2900: CPT

## 2017-09-27 RX ORDER — DOCUSATE SODIUM 100 MG
1 CAPSULE ORAL
Qty: 60 | Refills: 0 | OUTPATIENT
Start: 2017-09-27 | End: 2017-10-27

## 2017-09-27 RX ORDER — PANTOPRAZOLE SODIUM 20 MG/1
1 TABLET, DELAYED RELEASE ORAL
Qty: 30 | Refills: 0 | OUTPATIENT
Start: 2017-09-27 | End: 2017-10-27

## 2017-09-27 RX ORDER — INFLUENZA VIRUS VACCINE 15; 15; 15; 15 UG/.5ML; UG/.5ML; UG/.5ML; UG/.5ML
0.5 SUSPENSION INTRAMUSCULAR ONCE
Qty: 0 | Refills: 0 | Status: COMPLETED | OUTPATIENT
Start: 2017-09-27 | End: 2017-09-27

## 2017-09-27 RX ADMIN — HYDROMORPHONE HYDROCHLORIDE 1 MILLIGRAM(S): 2 INJECTION INTRAMUSCULAR; INTRAVENOUS; SUBCUTANEOUS at 06:40

## 2017-09-27 RX ADMIN — HYDROMORPHONE HYDROCHLORIDE 1 MILLIGRAM(S): 2 INJECTION INTRAMUSCULAR; INTRAVENOUS; SUBCUTANEOUS at 06:19

## 2017-09-27 RX ADMIN — ENOXAPARIN SODIUM 40 MILLIGRAM(S): 100 INJECTION SUBCUTANEOUS at 06:47

## 2017-09-27 RX ADMIN — INFLUENZA VIRUS VACCINE 0.5 MILLILITER(S): 15; 15; 15; 15 SUSPENSION INTRAMUSCULAR at 13:56

## 2017-09-27 RX ADMIN — LISINOPRIL 40 MILLIGRAM(S): 2.5 TABLET ORAL at 06:46

## 2017-09-27 RX ADMIN — HYDROMORPHONE HYDROCHLORIDE 1 MILLIGRAM(S): 2 INJECTION INTRAMUSCULAR; INTRAVENOUS; SUBCUTANEOUS at 12:55

## 2017-09-27 RX ADMIN — SODIUM CHLORIDE 150 MILLILITER(S): 9 INJECTION, SOLUTION INTRAVENOUS at 02:53

## 2017-09-27 RX ADMIN — AMLODIPINE BESYLATE 10 MILLIGRAM(S): 2.5 TABLET ORAL at 06:46

## 2017-09-27 NOTE — PROGRESS NOTE ADULT - SUBJECTIVE AND OBJECTIVE BOX
O/N: VSS, CRYSTAL, beverley diet  9/26: UGI WNL, holm removed, passed TOV , restarted on home medications OVERNIGHT EVENTS: VSS, CRYSTAL, beverley diet  9/26: UGI WNL, holm removed, passed TOV , restarted on home medications      STATUS POST:   RYGB    POST OPERATIVE DAY # 2    SUBJECTIVE: Patient examined bedside. Patient complains of incisional pain.    Flatus: [] YES [X] NO             Bowel Movement: [ ] YES [X ] NO  Pain (0-10):            Pain Control Adequate: [ ] YES [ ] NO  Nausea: [ ] YES [ X] NO            Vomiting: [ ] YES [X ] NO  Diarrhea: [ ] YES [X ] NO         Constipation: [ ] YES [X ] NO     Chest Pain: [ ] YES [X ] NO    SOB:  [ ] YES [X ] NO    enoxaparin Injectable 40 milliGRAM(s) SubCutaneous every 12 hours  amLODIPine   Tablet 10 milliGRAM(s) Oral daily  lisinopril 40 milliGRAM(s) Oral daily      Vital Signs Last 24 Hrs  T(C): 37.4 (27 Sep 2017 05:50), Max: 38.1 (26 Sep 2017 22:10)  T(F): 99.4 (27 Sep 2017 05:50), Max: 100.5 (26 Sep 2017 22:10)  HR: 84 (27 Sep 2017 01:10) (84 - 104)  BP: 165/77 (27 Sep 2017 01:10) (134/77 - 165/77)  BP(mean): 110 (27 Sep 2017 01:10) (89 - 110)  RR: 19 (27 Sep 2017 01:10) (16 - 23)  SpO2: 95% (27 Sep 2017 01:10) (94% - 929%)  I&O's Detail    26 Sep 2017 07:01  -  27 Sep 2017 07:00  --------------------------------------------------------  IN:    IV PiggyBack: 350 mL    lactated ringers.: 1650 mL  Total IN: 2000 mL    OUT:    Voided: 1675 mL  Total OUT: 1675 mL    Total NET: 325 mL          General: NAD, resting comfortably in bed  C/V: NSR  Pulm: Nonlabored breathing, no respiratory distress  Abd: soft, TTP around incision site, incision clean dry and intact    Extrem: WWP, no edema, SCDs in place        LABS:                        13.5   12.0  )-----------( 165      ( 27 Sep 2017 05:59 )             41.5     09-27    137  |  98  |  15  ----------------------------<  149<H>  4.3   |  29  |  1.02    Ca    8.6      27 Sep 2017 05:58  Phos  2.8     09-27  Mg     2.1     09-27

## 2017-09-28 PROBLEM — G47.33 OBSTRUCTIVE SLEEP APNEA (ADULT) (PEDIATRIC): Chronic | Status: ACTIVE | Noted: 2017-09-22

## 2017-10-03 ENCOUNTER — APPOINTMENT (OUTPATIENT)
Dept: SURGERY | Facility: CLINIC | Age: 52
End: 2017-10-03
Payer: MEDICAID

## 2017-10-03 VITALS
SYSTOLIC BLOOD PRESSURE: 130 MMHG | OXYGEN SATURATION: 98 % | TEMPERATURE: 98.2 F | DIASTOLIC BLOOD PRESSURE: 85 MMHG | HEART RATE: 87 BPM | WEIGHT: 315 LBS | BODY MASS INDEX: 44.1 KG/M2 | HEIGHT: 71 IN

## 2017-10-03 DIAGNOSIS — Z79.84 LONG TERM (CURRENT) USE OF ORAL HYPOGLYCEMIC DRUGS: ICD-10-CM

## 2017-10-03 DIAGNOSIS — Z83.3 FAMILY HISTORY OF DIABETES MELLITUS: ICD-10-CM

## 2017-10-03 DIAGNOSIS — Z79.82 LONG TERM (CURRENT) USE OF ASPIRIN: ICD-10-CM

## 2017-10-03 DIAGNOSIS — I10 ESSENTIAL (PRIMARY) HYPERTENSION: ICD-10-CM

## 2017-10-03 DIAGNOSIS — Z87.891 PERSONAL HISTORY OF NICOTINE DEPENDENCE: ICD-10-CM

## 2017-10-03 DIAGNOSIS — E66.01 MORBID (SEVERE) OBESITY DUE TO EXCESS CALORIES: ICD-10-CM

## 2017-10-03 DIAGNOSIS — E78.5 HYPERLIPIDEMIA, UNSPECIFIED: ICD-10-CM

## 2017-10-03 DIAGNOSIS — E11.9 TYPE 2 DIABETES MELLITUS WITHOUT COMPLICATIONS: ICD-10-CM

## 2017-10-03 DIAGNOSIS — E83.39 OTHER DISORDERS OF PHOSPHORUS METABOLISM: ICD-10-CM

## 2017-10-03 DIAGNOSIS — G47.33 OBSTRUCTIVE SLEEP APNEA (ADULT) (PEDIATRIC): ICD-10-CM

## 2017-10-03 PROCEDURE — 99024 POSTOP FOLLOW-UP VISIT: CPT

## 2017-10-03 RX ORDER — AMOXICILLIN 500 MG/1
500 CAPSULE ORAL
Qty: 56 | Refills: 0 | Status: DISCONTINUED | COMMUNITY
Start: 2017-07-14 | End: 2017-10-03

## 2017-11-07 ENCOUNTER — APPOINTMENT (OUTPATIENT)
Dept: SURGERY | Facility: CLINIC | Age: 52
End: 2017-11-07
Payer: MEDICAID

## 2017-11-07 VITALS
DIASTOLIC BLOOD PRESSURE: 82 MMHG | SYSTOLIC BLOOD PRESSURE: 128 MMHG | HEART RATE: 66 BPM | BODY MASS INDEX: 44.1 KG/M2 | TEMPERATURE: 97.7 F | WEIGHT: 315 LBS | OXYGEN SATURATION: 98 % | HEIGHT: 71 IN

## 2017-11-07 DIAGNOSIS — E66.01 MORBID (SEVERE) OBESITY DUE TO EXCESS CALORIES: ICD-10-CM

## 2017-11-07 DIAGNOSIS — Z98.84 BARIATRIC SURGERY STATUS: ICD-10-CM

## 2017-11-07 PROCEDURE — 99024 POSTOP FOLLOW-UP VISIT: CPT

## 2017-11-07 RX ORDER — METFORMIN HYDROCHLORIDE 625 MG/1
TABLET ORAL
Refills: 0 | Status: DISCONTINUED | COMMUNITY
End: 2017-11-07

## 2017-11-07 RX ORDER — OMEPRAZOLE 40 MG/1
40 CAPSULE, DELAYED RELEASE ORAL
Qty: 28 | Refills: 0 | Status: DISCONTINUED | COMMUNITY
Start: 2017-07-14 | End: 2017-11-07

## 2017-11-07 RX ORDER — CLARITHROMYCIN 500 MG/1
500 TABLET, FILM COATED ORAL
Qty: 28 | Refills: 0 | Status: DISCONTINUED | COMMUNITY
Start: 2017-07-14 | End: 2017-11-07

## 2018-01-09 ENCOUNTER — APPOINTMENT (OUTPATIENT)
Dept: SURGERY | Facility: CLINIC | Age: 53
End: 2018-01-09

## 2019-02-20 ENCOUNTER — MESSAGE (OUTPATIENT)
Age: 54
End: 2019-02-20

## 2023-04-17 NOTE — PATIENT PROFILE ADULT. - ALCOHOL USE HISTORY SINGLE SELECT
Occupational Therapy      Precautions:  Medical precautions:  fall risk; standard precautions.  Therapist wearing gloves, eye protection and surgical mask during session.    Patient was NOT wearing mask throughout duration of therapy session .     4.15: Pt admitted as OBS from home w/ unsteady gait and dizziness s/p fall w/ positive head trauma and LOC 4.12-> 3 C/OBS  CT H (-)  Trop (-)  OT/PT order received, up as tolerated.    Lines:     Basic: capped IV      Lines in chart and on patient reviewed, precautions maintained throughout session.  Hearing: functional.  Safety Measures: bed alarm and bed rails  SUBJECTIVE  Patient agreed to participate in therapy this date.      Through , \"I want to talk to the , I need more hours of CG.\"     OBJECTIVE     Cognitive Status   Level of Consciousness   - alert  Arousal Alertness   - appropriate responses to stimuli  Affect/Behavior    - calm and cooperative  Orientation    - Oriented to: person, place and time  Functional Communication   - Overall Status: impaired   - Forms of Communication: verbal  Attention Span    - Attention: impaired - attends with cues to redirect   - Attention impairment: distractibility, fatigue and internal factors  Following Direction   - follows one step commands with repetition and follows one step commands consistently  Transition Between Tasks   - transitions with cues  Executive Function    - Problem Solving: impaired  Memory   - - decreased recall of recent events - decreased short term memory  Safety Awareness/Insight   - impaired and decreased awareness of need for assistance  Awareness of Deficits   - decreased awareness of deficits  Verbal Expression   - intact    Patient Activity Tolerance: 1 to 1 activity to rest    UE Function:    • Left: functional    • Right: functional       Standing Balance  (JEROME = base of support)  Firm Surface: Double Leg      - Static, Eyes Closed       - Trial 1 details: minimal assist  and contact guard (Pt w/ significant anterior/ posterior sway)  Limited by patients report of headache and dizziness          Activities of Daily Living (ADLs)  Eating:   - Assist: independent  - Position: chair and upright in bed  Grooming/Oral Hygiene:   - Grooming assist: set up       - Oral hygiene assist: set up  - Position: edge of bed  Upper Body Dressing:  - Assist: set up  - Position: edge of bed  Lower Body Dressing:   - Assist: set up  - Position: edge of bed  - Footwear:       - Assistance: moderate assist  Toileting:   - Toilet transfer:        - Assist: contact guard/touching/steadying assist       - Device: 2-wheeled walker  Interventions    Training provided: activity tolerance, compensatory techniques, positioning, safety training, transfer training and use of adaptive equipment  Patient requires assistance with self care, meals. Pt limited by c/o headache pain, RLE pain.  Skilled input: verbal instruction/cues, tactile instruction/cues and posture correction  Verbal Consent: Writer verbally educated and received verbal consent for hand placement, positioning of patient, and techniques to be performed today from patient for clothing adjustments for techniques and therapist position for techniques as described above and how they are pertinent to the patient's plan of care.         ASSESSMENT  Impairments: activity tolerance, safety awareness, pain and strength  Functional Limitations: bed mobility, functional mobility, grooming, bathing, toileting, functional transfers, IADLs, showering and dressing     Discharge Recommendations:   Recommendations for Discharge: OT IL: Patient requires  intermittent assistance to perform mobility and/or ADLs safely, Patient is appropriate for Occupational Therapy 1-3 times per week     PT/OT Mobility Equipment for Discharge: RW, already owned but did not use at baseline.           Therapy Diagnosis:   Decreased ability to perform self care tasks and functional  transfers.            A minimum of 8 minutes per session x 1 week.    • Skilled therapy is required to address these limitations in attempt to maximize the patient's independence.    Education:   - Present and ready to learn: patient  Education provided during session:  - Role of acute  care OT  Activity progression  - Results of above outlined education: Needs reinforcement    Patient at End of Session:   Location: in bed  Safety measures: alarm system in place/re-engaged, call light within reach, equipment intact and bed rails x4  Handoff to: nurse    PLAN  Suggestions for next session as indicated: Frequency Comments: 1/1-2 MO home 4.17    A minimum of 8 minutes per session x 1 week in the acute setting.   Interventions: activity tolerance training, ADL retraining, compensatory techniques, energy conservation, functional transfer training, therapeutic activity, safety training, transfer training and use of adaptive equipment  Agreement to plan and goals: patient agrees with goals and treatment plan      GOALS  Long Term Goals: (to be met by time of discharge from hospital)  Upper body dressing: Patient will complete upper body dressing in sitting modified independent.  Lower body dressing: Patient will complete lower body dressing in sitting modified independent.  Toileting: Patient will complete toileting supervision.  Toilet transfer: Patient will complete toilet transfer with supervision.         Documented in the chart in the following areas:  Services. Prior Level of Function. Pain. Assessment. Plan.      Therapy procedure time and total treatment time can be found documented on the Time Entry flowsheet   never

## 2024-09-26 NOTE — DIETITIAN INITIAL EVALUATION ADULT. - PATIENT PROFILE REVIEWED
yes [Follow-Up Visit] : a follow-up visit for [FreeTextEntry2] :  bilateral Euflexxa #1 lot [id number] exp 10/26/2025